# Patient Record
Sex: FEMALE | Race: BLACK OR AFRICAN AMERICAN | NOT HISPANIC OR LATINO
[De-identification: names, ages, dates, MRNs, and addresses within clinical notes are randomized per-mention and may not be internally consistent; named-entity substitution may affect disease eponyms.]

---

## 2017-02-05 ENCOUNTER — TRANSCRIPTION ENCOUNTER (OUTPATIENT)
Age: 52
End: 2017-02-05

## 2017-04-17 ENCOUNTER — APPOINTMENT (OUTPATIENT)
Dept: GASTROENTEROLOGY | Facility: CLINIC | Age: 52
End: 2017-04-17

## 2021-05-21 ENCOUNTER — APPOINTMENT (OUTPATIENT)
Dept: OBGYN | Facility: CLINIC | Age: 56
End: 2021-05-21
Payer: COMMERCIAL

## 2021-05-21 VITALS — DIASTOLIC BLOOD PRESSURE: 85 MMHG | WEIGHT: 293 LBS | BODY MASS INDEX: 242.23 KG/M2 | SYSTOLIC BLOOD PRESSURE: 126 MMHG

## 2021-05-21 PROCEDURE — 99203 OFFICE O/P NEW LOW 30 MIN: CPT

## 2021-05-21 PROCEDURE — 99072 ADDL SUPL MATRL&STAF TM PHE: CPT

## 2021-05-21 NOTE — PHYSICAL EXAM
[Appropriately responsive] : appropriately responsive [Alert] : alert [No Acute Distress] : no acute distress [No Lymphadenopathy] : no lymphadenopathy [Regular Rate Rhythm] : regular rate rhythm [No Murmurs] : no murmurs [Clear to Auscultation B/L] : clear to auscultation bilaterally [Soft] : soft [Non-tender] : non-tender [Non-distended] : non-distended [No HSM] : No HSM [No Lesions] : no lesions [No Mass] : no mass [Oriented x3] : oriented x3 [Examination Of The Breasts] : a normal appearance [No Masses] : no breast masses were palpable [Vulvar Atrophy] : vulvar atrophy [Labia Majora] : normal [Labia Minora] : normal [Normal] : normal [Atrophy] : atrophy [Cystocele] : a cystocele [Absent] : absent [Uterine Adnexae] : normal

## 2021-05-22 LAB
C TRACH RRNA SPEC QL NAA+PROBE: NOT DETECTED
HPV HIGH+LOW RISK DNA PNL CVX: NOT DETECTED
N GONORRHOEA RRNA SPEC QL NAA+PROBE: NOT DETECTED
SOURCE TP AMPLIFICATION: NORMAL

## 2021-05-26 LAB — CYTOLOGY CVX/VAG DOC THIN PREP: NORMAL

## 2021-08-02 PROBLEM — Z82.3 FAMILY HISTORY OF CEREBROVASCULAR ACCIDENT (CVA): Status: ACTIVE | Noted: 2021-08-02

## 2021-08-02 PROBLEM — F12.90 USES MARIJUANA: Status: ACTIVE | Noted: 2021-08-02

## 2021-08-02 PROBLEM — Z80.42 FAMILY HISTORY OF MALIGNANT NEOPLASM OF PROSTATE: Status: ACTIVE | Noted: 2021-08-02

## 2021-08-03 ENCOUNTER — APPOINTMENT (OUTPATIENT)
Dept: UROGYNECOLOGY | Facility: CLINIC | Age: 56
End: 2021-08-03
Payer: COMMERCIAL

## 2021-08-03 ENCOUNTER — TRANSCRIPTION ENCOUNTER (OUTPATIENT)
Age: 56
End: 2021-08-03

## 2021-08-03 ENCOUNTER — RESULT CHARGE (OUTPATIENT)
Age: 56
End: 2021-08-03

## 2021-08-03 VITALS
WEIGHT: 133 LBS | DIASTOLIC BLOOD PRESSURE: 78 MMHG | SYSTOLIC BLOOD PRESSURE: 123 MMHG | HEIGHT: 62 IN | BODY MASS INDEX: 24.48 KG/M2

## 2021-08-03 DIAGNOSIS — F12.90 CANNABIS USE, UNSPECIFIED, UNCOMPLICATED: ICD-10-CM

## 2021-08-03 DIAGNOSIS — Z87.09 PERSONAL HISTORY OF OTHER DISEASES OF THE RESPIRATORY SYSTEM: ICD-10-CM

## 2021-08-03 DIAGNOSIS — Z80.42 FAMILY HISTORY OF MALIGNANT NEOPLASM OF PROSTATE: ICD-10-CM

## 2021-08-03 DIAGNOSIS — Z82.49 FAMILY HISTORY OF ISCHEMIC HEART DISEASE AND OTHER DISEASES OF THE CIRCULATORY SYSTEM: ICD-10-CM

## 2021-08-03 DIAGNOSIS — Z87.01 PERSONAL HISTORY OF PNEUMONIA (RECURRENT): ICD-10-CM

## 2021-08-03 DIAGNOSIS — Z82.3 FAMILY HISTORY OF STROKE: ICD-10-CM

## 2021-08-03 DIAGNOSIS — Z86.39 PERSONAL HISTORY OF OTHER ENDOCRINE, NUTRITIONAL AND METABOLIC DISEASE: ICD-10-CM

## 2021-08-03 DIAGNOSIS — Z86.018 PERSONAL HISTORY OF OTHER BENIGN NEOPLASM: ICD-10-CM

## 2021-08-03 LAB
BILIRUB UR QL STRIP: NEGATIVE
CLARITY UR: CLEAR
COLLECTION METHOD: NORMAL
GLUCOSE UR-MCNC: NEGATIVE
HCG UR QL: 1 EU/DL
HGB UR QL STRIP.AUTO: NEGATIVE
KETONES UR-MCNC: NEGATIVE
LEUKOCYTE ESTERASE UR QL STRIP: NEGATIVE
NITRITE UR QL STRIP: NEGATIVE
PH UR STRIP: 7.5
PROT UR STRIP-MCNC: NEGATIVE
SP GR UR STRIP: 1.01

## 2021-08-03 PROCEDURE — 99204 OFFICE O/P NEW MOD 45 MIN: CPT | Mod: 25

## 2021-08-03 PROCEDURE — 51701 INSERT BLADDER CATHETER: CPT

## 2021-08-03 NOTE — HISTORY OF PRESENT ILLNESS
[FreeTextEntry1] : 20 years of botherome leakage of urine, worse in past 2-3 years. No intervention as of yet. Leakage with cough sneeze exercise running and intercourse > with urge when full and rarely. Pads wet daily. No dysuria, recurrent UTIs, gross hematuria, incomplete emptying, or flank pain. No symptoms of bulge or pressure in vagina or vaginal laxity. Sexually active without issue besides dryness at times. No vaginal bleeding. Chronic constipation she does not feel is diet related as she eats high fiber, healthy, vegetarian diet. Juices - natural vegetable and fruit juices. UI affecting QOL. Nocturia 1. 24 hour VD: 9v0L, intake 36 oz water / 20 oz spinach pineapple juice / 10 oz wine / 40 oz hot tea --> over 100 oz daily\par \par BMI 24\par Allg includes PCNs\par SH not a tobacco smoker\par PSH includes "partial" hysterectomy - likely ELZA, salpingectomy for ectopic pregnancy, and appendectomy (reports ectopic and appendectomy performed during same surg, hyst lsc plus laparotomy via the pfannenstiel)\par

## 2021-08-03 NOTE — OB HISTORY
[Total Preg ___] : : [unfilled] [Full Term ___] : [unfilled] (full-term) [Vaginal ___] : [unfilled] vaginal delivery(s) [Definite ___ (Date)] : the last menstrual period was [unfilled] [Last Pap Smear ___] : date of last pap smear was on [unfilled] [Sexually Active] : sexually active [Abnormal Pap Smear] : normal pap smear [Taking Estrogens] : is not taking estrogen replacement [Abnormal Bleeding] : without bleeding

## 2021-08-03 NOTE — ASSESSMENT
[FreeTextEntry1] : Karlie is a pleasant 57 yo  P4, PSHx includes ELZA, salpingectomy and appendectomy, presents with years of symptomatic KAYLYNN, TUCKER > OAB, and less bothersome POP. On exam, her empty supine CST was neg, however she had positive urethral hypermobility. Her straight-cath PVR volume was normal and the dip was neg. On pelvic exam, she had a positive bulbo reflex. There were no appreciable masses, cysts, or lesions. Pelvic floor muscle contraction strength was present but weak. There was no levator or pelvic floor musculature banding, tightness, or tenderness. POPQ exam demonstrated a short post-hyst TVL with intact cuff, atrophic changes, and decently supported apex. She had a stage II anterior wall prolapse.\par \par The patient has pelvic organ prolapse. Management options including observation, kegels with or without PT, biofeedback, pessary, and surgery were reviewed. Pessary care was reviewed. Surg options obliterative vs reconstructive, major vs minor, abd / robotic vs vaginal, with or without hysterectomy, with or without graft use discussed.\par \par The patient has symptoms consistent with stress urinary incontinence. The etiology of TUCKER was discussed. Management options including observation, behavioral modifications, medication, pessary, Impressa insert, periurethral bulking via cystoscopy, and surgery with midurethral sling were reviewed. Other anti-incontinence procedures such as a Castro or fascial sling also reviewed.\par \par The patient has urinary symptoms consistent with overactive bladder. The etiology of OAB was discussed. Management options including observation, behavioral modifications (dietary changes, monitoring fluid intake, bladder training, timed voids, use of pads/protective garments), kegels, PT, medications, PTNS, SNS, and bladder Botox were all reviewed.\par \par She is interested in surg correction. We discussed apical repairs vs vag anterior repair alone, efficacy, risk of future apical POP and symptoms if only AR performed. We discussed risks of apical major vs minor vaginal repair. \par \par She is interested in MUS/AR and will RTO for cysto, UDS to eval further. All ques answered.\par \par Plan:\par [] cysto\par [] UDS\par [] surg disc - anticipate booking EUA / TVT MUS / AR / cysto / other indicated procedures --> treat OAB thereafter prn (freq may improve with reduced overall daily fluid intake and irritant reduction)\par \par \par

## 2021-08-03 NOTE — REASON FOR VISIT
[Initial Visit ___] : an initial visit for [unfilled] [Questionnaire Received] : Patient questionnaire received [Urinary Incontinence] : urinary incontinence [Pelvic Organ Prolapse] : pelvic organ prolapse [Pelvic Pain] : pelvic pain [Sexual Dysfunction] : sexual dysfunction

## 2021-08-03 NOTE — PHYSICAL EXAM
[2] : 2 [Aa ____] : Aa [unfilled] [Ba ____] : Ba [unfilled] [C ____] : C [unfilled] [GH ____] : GH [unfilled] [PB ____] : PB [unfilled] [TVL ____] : TVL  [unfilled] [Ap ____] : Ap [unfilled] [Bp ____] : Bp [unfilled] [] : I [Absent] : absent [Soft] :  the cervix was soft [Chaperone Present] : A chaperone was present in the examining room during all aspects of the physical examination [No Acute Distress] : in no acute distress [Oriented x3] : oriented to person, place, and time [No Edema] : ~T edema was not present [Symmetrical] : the neck was ~L symmetrical [Soft, Nontender] : the abdomen was soft and nontender [None] : no CVA tenderness [Warm and Dry] : was warm and dry to touch [Normal Gait] : gait was normal [Labia Majora] : were normal [Labia Minora] : were normal [Normal] : was normal [Bartholin's Gland] : both Bartholin's glands were normal  [Normal Appearance] : general appearance was normal [No Bleeding] : there was no active vaginal bleeding [Post Void Residual ____ml] : post void residual was [unfilled] ml [Exam Deferred] : was deferred [Cough] : no cough [Tenderness] : ~T no ~M abdominal tenderness observed [Distended] : not distended [Inguinal LAD] : no adenopathy was noted in the inguinal lymph nodes [FreeTextEntry3] : +urethral hypermobility, empty supine cst neg [FreeTextEntry4] : no cyst mass or lesion; cuff intact; short TVL [de-identified] : nontender, no appreciable mass

## 2021-08-26 ENCOUNTER — APPOINTMENT (OUTPATIENT)
Dept: UROGYNECOLOGY | Facility: CLINIC | Age: 56
End: 2021-08-26
Payer: COMMERCIAL

## 2021-08-26 PROCEDURE — 51741 ELECTRO-UROFLOWMETRY FIRST: CPT

## 2021-08-26 PROCEDURE — 51797 INTRAABDOMINAL PRESSURE TEST: CPT

## 2021-08-26 PROCEDURE — 51729 CYSTOMETROGRAM W/VP&UP: CPT

## 2021-08-26 PROCEDURE — 51784 ANAL/URINARY MUSCLE STUDY: CPT

## 2021-09-24 ENCOUNTER — APPOINTMENT (OUTPATIENT)
Dept: UROGYNECOLOGY | Facility: CLINIC | Age: 56
End: 2021-09-24
Payer: COMMERCIAL

## 2021-09-24 LAB
BILIRUB UR QL STRIP: NEGATIVE
CLARITY UR: CLEAR
COLLECTION METHOD: NORMAL
GLUCOSE UR-MCNC: NEGATIVE
HCG UR QL: 0.2 EU/DL
HGB UR QL STRIP.AUTO: NEGATIVE
KETONES UR-MCNC: NEGATIVE
LEUKOCYTE ESTERASE UR QL STRIP: NEGATIVE
NITRITE UR QL STRIP: NEGATIVE
PH UR STRIP: 6
PROT UR STRIP-MCNC: NEGATIVE
SP GR UR STRIP: 1.02

## 2021-09-24 PROCEDURE — 81003 URINALYSIS AUTO W/O SCOPE: CPT | Mod: NC,QW

## 2021-09-24 PROCEDURE — 99213 OFFICE O/P EST LOW 20 MIN: CPT | Mod: 25

## 2021-09-24 PROCEDURE — 52000 CYSTOURETHROSCOPY: CPT

## 2021-10-05 ENCOUNTER — APPOINTMENT (OUTPATIENT)
Dept: UROGYNECOLOGY | Facility: CLINIC | Age: 56
End: 2021-10-05
Payer: COMMERCIAL

## 2021-10-05 PROCEDURE — 99214 OFFICE O/P EST MOD 30 MIN: CPT

## 2021-10-05 NOTE — ASSESSMENT
[FreeTextEntry1] : KAYLYNN, GSUI and ISD, post-hyst vault POP primarily anterior component. \par \par For the POP, obliterative vs reconstructive surgery discussed, minor vs major procedures discussed. Abdominal versus vaginal routes of surgery were reviewed. Abdominal surgery via robotic laparoscopy vs laparotomy discussed. Surgery with or without graft use discussed. Efficacies, risks, and benefits reviewed. Abdominally, a sacral colpopexy was discussed. Vaginally, a uterosacral ligament suspension or sacrospinous fixation was discussed. With vag minor AR, risk of future recurrence/apical POP discussed.\par \par We discussed the placement of a mid-urethral sling. Risks and benefits of a TOT sling versus a TVT vs mini sling routes were discussed including bladder and bowel perforation, vascular injury and hemorrhage, voiding dysfunction, UTIs, dyspareunia, and groin pain.\par \par The risks and benefits of surgery were discussed and included: risks of general anesthesia, MI, stroke, cardiopulmonary arrest, infection, abscess formation, bleeding, hematoma formation, hemorrhage, transfusion, blood clot formation, fistula formation, rejection, dyspareunia, chronic pain, neuropathy, damage to surrounding structures including but not limited to bowel/ureters/bladder/rectum/nerves/vessels, leakage of urine, voiding dysfunction, OAB, urinary retention, procedure failure, recurrence, need for further surgery, and going home with a catheter. With the use of mesh, risk of mesh erosion or exposure discussed. The FDA warning on transvaginally placed mesh for prolapse correction versus abdominally placed mesh or transvaginally placed mesh for midurethral sling was reviewed. Website for the FDA provided for information as desired. An exam under anesthesia was discussed and consented to as well. Perioperative care, anesthesia, NPO prior to the surgery, and postop precautions were reviewed. All questions were answered. She understood and would like to proceed. Consent was signed and witnessed in the office.\par \par Plan:\par [] book surg: pelvic EUA / RASC / TVT MUS / cystourethroscopy / other indicated procedures / possible APR or laparotomy\par [] med clearance\par [] PSTs\par

## 2021-10-05 NOTE — HISTORY OF PRESENT ILLNESS
[FreeTextEntry1] : Symptomatic KAYLYNN, post-hyst POP. Hx prior pelvic surg. DEsires surg correction, declines nonsurg intervention such as observation, kegels/PT, pessary use; periurethral bulking injections for TUCKER.\par \par Initial exam findings PVR normal, A 0 / C -6 / TVL 7 / GH 4 / P -3 --> repeated at cysto A 0 / P -2 / TVL 7\par \par UDS pvr normal, normal capacity, no DO, +GSUI, +ISD\par Cysto normal\par \par BMI 24\par Allg includes PCNs\par SH not a tobacco smoker\par PSH includes "partial" hysterectomy - likely ELZA, salpingectomy for ectopic pregnancy, and appendectomy (reports ectopic and appendectomy performed during same surg, hyst lsc plus laparotomy via the pfannenstiel)\par

## 2021-11-11 ENCOUNTER — OUTPATIENT (OUTPATIENT)
Dept: OUTPATIENT SERVICES | Facility: HOSPITAL | Age: 56
LOS: 1 days | End: 2021-11-11
Payer: COMMERCIAL

## 2021-11-11 VITALS
SYSTOLIC BLOOD PRESSURE: 100 MMHG | RESPIRATION RATE: 16 BRPM | HEIGHT: 62 IN | WEIGHT: 132.5 LBS | HEART RATE: 68 BPM | OXYGEN SATURATION: 98 % | TEMPERATURE: 97 F | DIASTOLIC BLOOD PRESSURE: 60 MMHG

## 2021-11-11 DIAGNOSIS — Z90.49 ACQUIRED ABSENCE OF OTHER SPECIFIED PARTS OF DIGESTIVE TRACT: Chronic | ICD-10-CM

## 2021-11-11 DIAGNOSIS — Z01.818 ENCOUNTER FOR OTHER PREPROCEDURAL EXAMINATION: ICD-10-CM

## 2021-11-11 DIAGNOSIS — Z90.710 ACQUIRED ABSENCE OF BOTH CERVIX AND UTERUS: Chronic | ICD-10-CM

## 2021-11-11 LAB
ANION GAP SERPL CALC-SCNC: 13 MMOL/L — SIGNIFICANT CHANGE UP (ref 5–17)
APPEARANCE UR: CLEAR — SIGNIFICANT CHANGE UP
APTT BLD: 29 SEC — SIGNIFICANT CHANGE UP (ref 27.5–35.5)
BASOPHILS # BLD AUTO: 0.02 K/UL — SIGNIFICANT CHANGE UP (ref 0–0.2)
BASOPHILS NFR BLD AUTO: 0.6 % — SIGNIFICANT CHANGE UP (ref 0–2)
BILIRUB UR-MCNC: NEGATIVE — SIGNIFICANT CHANGE UP
BUN SERPL-MCNC: 9.9 MG/DL — SIGNIFICANT CHANGE UP (ref 8–20)
CALCIUM SERPL-MCNC: 9.1 MG/DL — SIGNIFICANT CHANGE UP (ref 8.6–10.2)
CHLORIDE SERPL-SCNC: 106 MMOL/L — SIGNIFICANT CHANGE UP (ref 98–107)
CO2 SERPL-SCNC: 25 MMOL/L — SIGNIFICANT CHANGE UP (ref 22–29)
COLOR SPEC: SIGNIFICANT CHANGE UP
CREAT SERPL-MCNC: 0.63 MG/DL — SIGNIFICANT CHANGE UP (ref 0.5–1.3)
DIFF PNL FLD: NEGATIVE — SIGNIFICANT CHANGE UP
EOSINOPHIL # BLD AUTO: 0.03 K/UL — SIGNIFICANT CHANGE UP (ref 0–0.5)
EOSINOPHIL NFR BLD AUTO: 0.8 % — SIGNIFICANT CHANGE UP (ref 0–6)
GLUCOSE SERPL-MCNC: 96 MG/DL — SIGNIFICANT CHANGE UP (ref 70–99)
GLUCOSE UR QL: NEGATIVE MG/DL — SIGNIFICANT CHANGE UP
HCT VFR BLD CALC: 40 % — SIGNIFICANT CHANGE UP (ref 34.5–45)
HGB BLD-MCNC: 12.8 G/DL — SIGNIFICANT CHANGE UP (ref 11.5–15.5)
IMM GRANULOCYTES NFR BLD AUTO: 0.3 % — SIGNIFICANT CHANGE UP (ref 0–1.5)
INR BLD: 0.92 RATIO — SIGNIFICANT CHANGE UP (ref 0.88–1.16)
KETONES UR-MCNC: NEGATIVE — SIGNIFICANT CHANGE UP
LEUKOCYTE ESTERASE UR-ACNC: NEGATIVE — SIGNIFICANT CHANGE UP
LYMPHOCYTES # BLD AUTO: 1.54 K/UL — SIGNIFICANT CHANGE UP (ref 1–3.3)
LYMPHOCYTES # BLD AUTO: 42.5 % — SIGNIFICANT CHANGE UP (ref 13–44)
MCHC RBC-ENTMCNC: 30.6 PG — SIGNIFICANT CHANGE UP (ref 27–34)
MCHC RBC-ENTMCNC: 32 GM/DL — SIGNIFICANT CHANGE UP (ref 32–36)
MCV RBC AUTO: 95.7 FL — SIGNIFICANT CHANGE UP (ref 80–100)
MONOCYTES # BLD AUTO: 0.43 K/UL — SIGNIFICANT CHANGE UP (ref 0–0.9)
MONOCYTES NFR BLD AUTO: 11.9 % — SIGNIFICANT CHANGE UP (ref 2–14)
NEUTROPHILS # BLD AUTO: 1.59 K/UL — LOW (ref 1.8–7.4)
NEUTROPHILS NFR BLD AUTO: 43.9 % — SIGNIFICANT CHANGE UP (ref 43–77)
NITRITE UR-MCNC: NEGATIVE — SIGNIFICANT CHANGE UP
PH UR: 6.5 — SIGNIFICANT CHANGE UP (ref 5–8)
PLATELET # BLD AUTO: 209 K/UL — SIGNIFICANT CHANGE UP (ref 150–400)
POTASSIUM SERPL-MCNC: 4.3 MMOL/L — SIGNIFICANT CHANGE UP (ref 3.5–5.3)
POTASSIUM SERPL-SCNC: 4.3 MMOL/L — SIGNIFICANT CHANGE UP (ref 3.5–5.3)
PROT UR-MCNC: NEGATIVE MG/DL — SIGNIFICANT CHANGE UP
PROTHROM AB SERPL-ACNC: 10.7 SEC — SIGNIFICANT CHANGE UP (ref 10.6–13.6)
RBC # BLD: 4.18 M/UL — SIGNIFICANT CHANGE UP (ref 3.8–5.2)
RBC # FLD: 12.8 % — SIGNIFICANT CHANGE UP (ref 10.3–14.5)
SODIUM SERPL-SCNC: 144 MMOL/L — SIGNIFICANT CHANGE UP (ref 135–145)
SP GR SPEC: 1.01 — SIGNIFICANT CHANGE UP (ref 1.01–1.02)
UROBILINOGEN FLD QL: NEGATIVE MG/DL — SIGNIFICANT CHANGE UP
WBC # BLD: 3.62 K/UL — LOW (ref 3.8–10.5)
WBC # FLD AUTO: 3.62 K/UL — LOW (ref 3.8–10.5)

## 2021-11-11 PROCEDURE — 93010 ELECTROCARDIOGRAM REPORT: CPT

## 2021-11-11 PROCEDURE — 36415 COLL VENOUS BLD VENIPUNCTURE: CPT

## 2021-11-11 PROCEDURE — 85730 THROMBOPLASTIN TIME PARTIAL: CPT

## 2021-11-11 PROCEDURE — 81003 URINALYSIS AUTO W/O SCOPE: CPT

## 2021-11-11 PROCEDURE — 85610 PROTHROMBIN TIME: CPT

## 2021-11-11 PROCEDURE — 93005 ELECTROCARDIOGRAM TRACING: CPT

## 2021-11-11 PROCEDURE — 85025 COMPLETE CBC W/AUTO DIFF WBC: CPT

## 2021-11-11 PROCEDURE — 80048 BASIC METABOLIC PNL TOTAL CA: CPT

## 2021-11-11 PROCEDURE — G0463: CPT

## 2021-11-11 PROCEDURE — 87086 URINE CULTURE/COLONY COUNT: CPT

## 2021-11-11 RX ORDER — INFLUENZA VIRUS VACCINE 15; 15; 15; 15 UG/.5ML; UG/.5ML; UG/.5ML; UG/.5ML
0.5 SUSPENSION INTRAMUSCULAR ONCE
Refills: 0 | Status: DISCONTINUED | OUTPATIENT
Start: 2021-11-11 | End: 2021-11-25

## 2021-11-11 RX ORDER — GENTAMICIN SULFATE 40 MG/ML
300 VIAL (ML) INJECTION ONCE
Refills: 0 | Status: COMPLETED | OUTPATIENT
Start: 2021-12-01 | End: 2021-12-01

## 2021-11-11 RX ORDER — METRONIDAZOLE 500 MG
500 TABLET ORAL ONCE
Refills: 0 | Status: COMPLETED | OUTPATIENT
Start: 2021-12-01 | End: 2021-12-01

## 2021-11-11 NOTE — H&P PST ADULT - NSANTHOSAYNRD_GEN_A_CORE
No. EDU screening performed.  STOP BANG Legend: 0-2 = LOW Risk; 3-4 = INTERMEDIATE Risk; 5-8 = HIGH Risk

## 2021-11-11 NOTE — H&P PST ADULT - NSICDXFAMILYHX_GEN_ALL_CORE_FT
FAMILY HISTORY:  Father  Still living? No  FH: heart disease, Age at diagnosis: Age Unknown    Mother  Still living? No  FHx: dementia, Age at diagnosis: Age Unknown    Sibling  Still living? Yes, Estimated age: Age Unknown  FH: stroke, Age at diagnosis: Age Unknown

## 2021-11-11 NOTE — H&P PST ADULT - PROBLEM SELECTOR PLAN 2
Pt is scheduled for a robotic assisted sacrocolpopexy, possible anterior and posterior repair midurrthral cystoscopy with Dr Peres 12/1/2021.

## 2021-11-11 NOTE — H&P PST ADULT - ASSESSMENT
56-year-old female presents to PST for a      Patients chief complaint of urinary stress incontinence and vaginal bulge with uncomfortable pressure.  Pt experiences some pain when pressure is applied to lower abdomin.  Pt states she does not take any medication, velazquez deals with it.  . She denies hot flashes or vaginal discharge or itching. Pt is scheduled for a robotic assisted sacrocolpopexy, possible anterior and posterior repair midurrthral cystoscopy with Dr Peres 2021.      Patient was educated on preoperative preperation with written and verbal instruction . Patient is going for medical clearance with Dr. Lindsay  patient will review medications with PCP. Patient was educated on aspirin and aspirin products NSAIDS ,vitamins and herbals that increase the risk of bleeding and need to be stopped five days before procedure  . Patient was also educated on covid testing and covid prevention ,social distancing and wearing a mask.     Pt recieved COVID vaccineation  [employer].  COVID testing scheduled 2021    OPIOID RISK TOOL    ANNE EACH BOX THAT APPLIES AND ADD TOTALS AT THE END    FAMILY HISTORY OF SUBSTANCE ABUSE                 FEMALE         MALE                                                Alcohol                             [  ]1 pt          [  ]3pts                                               Illegal Durgs                     [  ]2 pts        [  ]3pts                                               Rx Drugs                           [  ]4 pts        [  ]4 pts    PERSONAL HISTORY OF SUBSTANCE ABUSE                                                                                          Alcohol                             [  ]3 pts       [  ]3 pts                                               Illegal Drugs                     [  ]4 pts        [  ]4 pts                                               Rx Drugs                           [  ]5 pts        [  ]5 pts    AGE BETWEEN 16-45 YEARS                                      [  ]1 pt         [  ]1 pt    HISTORY OF PREADOLESCENT   SEXUAL ABUSE                                                             [  ]3 pts        [  ]0pts    PSYCHOLOGICAL DISEASE                     ADD, OCD, Bipolar, Schizophrenia        [  ]2 pts         [  ]2 pts                      Depression                                               [  ]1 pt           [  ]1 pt           SCORING TOTAL   (add numbers and type here)              0                                  CAPRINI SCORE [CLOT]    AGE RELATED RISK FACTORS                                                       MOBILITY RELATED FACTORS  [x ] Age 41-60 years                                            (1 Point)                  [ ] Bed rest                                                        (1 Point)  [ ] Age: 61-74 years                                           (2 Points)                 [ ] Plaster cast                                                   (2 Points)  [ ] Age= 75 years                                              (3 Points)                 [ ] Bed bound for more than 72 hours                 (2 Points)    DISEASE RELATED RISK FACTORS                                               GENDER SPECIFIC FACTORS  [ ] Edema in the lower extremities                       (1 Point)                  [ ] Pregnancy                                                     (1 Point)  [ ] Varicose veins                                               (1 Point)                  [ ] Post-partum < 6 weeks                                   (1 Point)             [ ] BMI > 25 Kg/m2                                            (1 Point)                  [ ] Hormonal therapy  or oral contraception          (1 Point)                 [ ] Sepsis (in the previous month)                        (1 Point)                  [ ] History of pregnancy complications                 (1 point)  [ ] Pneumonia or serious lung disease                                               [ ] Unexplained or recurrent                     (1 Point)           (in the previous month)                               (1 Point)  [ ] Abnormal pulmonary function test                     (1 Point)                 SURGERY RELATED RISK FACTORS  [ ] Acute myocardial infarction                              (1 Point)                 [ ]  Section                                             (1 Point)  [ ] Congestive heart failure (in the previous month)  (1 Point)               [ ] Minor surgery                                                  (1 Point)   [ ] Inflammatory bowel disease                             (1 Point)                 [ ] Arthroscopic surgery                                        (2 Points)  [ ] Central venous access                                      (2 Points)                [x ] General surgery lasting more than 45 minutes   (2 Points)       [ ] Stroke (in the previous month)                          (5 Points)               [ ] Elective arthroplasty                                         (5 Points)                                                                                                                                               HEMATOLOGY RELATED FACTORS                                                 TRAUMA RELATED RISK FACTORS  [ ] Prior episodes of VTE                                     (3 Points)                [ ] Fracture of the hip, pelvis, or leg                       (5 Points)  [ ] Positive family history for VTE                         (3 Points)                 [ ] Acute spinal cord injury (in the previous month)  (5 Points)  [ ] Prothrombin 67750 A                                     (3 Points)                 [ ] Paralysis  (less than 1 month)                             (5 Points)  [ ] Factor V Leiden                                             (3 Points)                  [ ] Multiple Trauma within 1 month                        (5 Points)  [ ] Lupus anticoagulants                                     (3 Points)                                                           [ ] Anticardiolipin antibodies                               (3 Points)                                                       [ ] High homocysteine in the blood                      (3 Points)                                             [ ] Other congenital or acquired thrombophilia      (3 Points)                                                [ ] Heparin induced thrombocytopenia                  (3 Points)                                          Total Score [      3    ]

## 2021-11-11 NOTE — PATIENT PROFILE ADULT - NSPREOP1_NPOAFTER_GEN_A_NUR
Digital Medicine: Health  Follow-Up    The history is provided by the patient.             Reason for review: Blood pressure not at goal        Topics Covered on Call: physical activity and Diet    Additional Follow-up details: Average blood pressure today is 142/87. Patient reports that that she has been really stressed and busy. She doesn't feel that she rests enough before taking her readings. She is busy during the week taking care of her grandkids and running them places. Denies hypertension symptoms today. She occasionally is SOB when she is doing activity and will have some palpitations occasionally at night but has talked to her provider about these things.             Diet-no change to diet    No change to diet.  Patient reports eating or drinking the following: She is still working on a low sodium diet. She pays attention to what she is eating and doesn't add salt.      Physical Activity-Change      Additional physical activity details: She feels that she has been more active lately. She is getting her walking in and doing more yard work. This has been good for her.       Medication Adherence-Medication adherence was assessed.      Substance, Sleep, Stress-Not assessed      Continue current diet/physical activity routine.       Addressed patient questions and patient has my contact information if needed prior to next outreach. Patient verbalizes understanding.             There are no preventive care reminders to display for this patient.      Last 5 Patient Entered Readings                                      Current 30 Day Average: 142/87     Recent Readings 10/25/2020 10/25/2020 10/12/2020 10/1/2020 10/1/2020    SBP (mmHg) 141 139 142 134 134    DBP (mmHg) 90 87 84 86 96    Pulse 79 82 73 74 75                23:00

## 2021-11-11 NOTE — H&P PST ADULT - HISTORY OF PRESENT ILLNESS
55-year-old female presents to PST for a      Patients chief complaint of urinary stress incontinence and vaginal bulge. She denies hot flashes or vaginal discharge or itching.    55-year-old female presents to PST for a      Patients chief complaint of urinary stress incontinence and vaginal bulge with uncomfortable pressure.  Pt experiences some pain when pressure is applied to lower abdomin.  Pt states she does not take any medication, velazquez deals with it.  . She denies hot flashes or vaginal discharge or itching. Pt is scheduled for a robotic assisted sacrocolpopexy, possible anterior and posterior repair midurrthral cystoscopy with Dr Peres 12/1/2021.   56-year-old female presents to PST for a      Patients chief complaint of urinary stress incontinence and vaginal bulge with uncomfortable pressure.  Pt experiences some pain when pressure is applied to lower abdomin.  Pt states she does not take any medication, velazquez deals with it.  . She denies hot flashes or vaginal discharge or itching. Pt is scheduled for a robotic assisted sacrocolpopexy, possible anterior and posterior repair midurrthral cystoscopy with Dr Peres 12/1/2021.

## 2021-11-11 NOTE — H&P PST ADULT - ATTENDING COMMENTS
Patient seen, continues to desire surgical correction of POP and GSUI, declines nonsurg intervention such as observation, kegels/PT, or pessary use. Risks of surgery including but not limited to bleeding, hematoma formation, transfusion, hemorrhage, cardiopulmonary arrest and risks of anesthesia, stroke, MI, fistula formation, mesh erosion or exposure, blood clot, failure, need for further surgery, recurrence, urinary retention or incontinence, going home with a catheter, damage to surrounding structures such as bowel / bladder / rectum / ureters / nerves / vessels all reviewed and she would like to proceed. Consent signed and witnessed in office, reviewed today. All ques answered. Proceed with EUA / RASC / TVT MUS / cysto / other indicated procedures / possible APR and possible laparotomy.

## 2021-11-12 LAB
CULTURE RESULTS: SIGNIFICANT CHANGE UP
SPECIMEN SOURCE: SIGNIFICANT CHANGE UP

## 2021-11-16 ENCOUNTER — OUTPATIENT (OUTPATIENT)
Dept: OUTPATIENT SERVICES | Facility: HOSPITAL | Age: 56
LOS: 1 days | End: 2021-11-16
Payer: COMMERCIAL

## 2021-11-16 DIAGNOSIS — Z01.812 ENCOUNTER FOR PREPROCEDURAL LABORATORY EXAMINATION: ICD-10-CM

## 2021-11-16 DIAGNOSIS — Z90.710 ACQUIRED ABSENCE OF BOTH CERVIX AND UTERUS: Chronic | ICD-10-CM

## 2021-11-16 DIAGNOSIS — Z90.49 ACQUIRED ABSENCE OF OTHER SPECIFIED PARTS OF DIGESTIVE TRACT: Chronic | ICD-10-CM

## 2021-11-16 PROBLEM — Z78.9 OTHER SPECIFIED HEALTH STATUS: Chronic | Status: ACTIVE | Noted: 2021-11-11

## 2021-11-16 LAB — BLD GP AB SCN SERPL QL: SIGNIFICANT CHANGE UP

## 2021-11-16 PROCEDURE — 86850 RBC ANTIBODY SCREEN: CPT

## 2021-11-16 PROCEDURE — 36415 COLL VENOUS BLD VENIPUNCTURE: CPT

## 2021-11-16 PROCEDURE — 86901 BLOOD TYPING SEROLOGIC RH(D): CPT

## 2021-11-16 PROCEDURE — 86900 BLOOD TYPING SEROLOGIC ABO: CPT

## 2021-11-28 ENCOUNTER — APPOINTMENT (OUTPATIENT)
Dept: DISASTER EMERGENCY | Facility: CLINIC | Age: 56
End: 2021-11-28

## 2021-11-28 DIAGNOSIS — Z01.818 ENCOUNTER FOR OTHER PREPROCEDURAL EXAMINATION: ICD-10-CM

## 2021-11-29 LAB — SARS-COV-2 N GENE NPH QL NAA+PROBE: NOT DETECTED

## 2021-11-30 ENCOUNTER — TRANSCRIPTION ENCOUNTER (OUTPATIENT)
Age: 56
End: 2021-11-30

## 2021-11-30 RX ORDER — OXYCODONE AND ACETAMINOPHEN 5; 325 MG/1; MG/1
5-325 TABLET ORAL
Qty: 12 | Refills: 0 | Status: ACTIVE | COMMUNITY
Start: 2021-11-30 | End: 1900-01-01

## 2021-11-30 RX ADMIN — Medication 30 MILLIGRAM(S): at 23:27

## 2021-12-01 ENCOUNTER — APPOINTMENT (OUTPATIENT)
Dept: UROGYNECOLOGY | Facility: HOSPITAL | Age: 56
End: 2021-12-01
Payer: SELF-PAY

## 2021-12-01 ENCOUNTER — INPATIENT (INPATIENT)
Facility: HOSPITAL | Age: 56
LOS: 1 days | Discharge: HOME | DRG: 747 | End: 2021-12-03
Attending: OBSTETRICS & GYNECOLOGY | Admitting: OBSTETRICS & GYNECOLOGY
Payer: COMMERCIAL

## 2021-12-01 VITALS — HEIGHT: 62 IN | HEART RATE: 73 BPM | RESPIRATION RATE: 16 BRPM | WEIGHT: 132.28 LBS

## 2021-12-01 DIAGNOSIS — Z90.710 ACQUIRED ABSENCE OF BOTH CERVIX AND UTERUS: Chronic | ICD-10-CM

## 2021-12-01 DIAGNOSIS — Z90.49 ACQUIRED ABSENCE OF OTHER SPECIFIED PARTS OF DIGESTIVE TRACT: Chronic | ICD-10-CM

## 2021-12-01 DIAGNOSIS — N39.3 STRESS INCONTINENCE (FEMALE) (MALE): ICD-10-CM

## 2021-12-01 PROCEDURE — 57425 LAPAROSCOPY SURG COLPOPEXY: CPT

## 2021-12-01 PROCEDURE — 58660 LAPAROSCOPY LYSIS: CPT

## 2021-12-01 PROCEDURE — 57288 REPAIR BLADDER DEFECT: CPT

## 2021-12-01 RX ORDER — ACETAMINOPHEN 500 MG
975 TABLET ORAL ONCE
Refills: 0 | Status: COMPLETED | OUTPATIENT
Start: 2021-12-01 | End: 2021-12-01

## 2021-12-01 RX ORDER — GENTAMICIN SULFATE 40 MG/ML
150 VIAL (ML) INJECTION ONCE
Refills: 0 | Status: COMPLETED | OUTPATIENT
Start: 2021-12-01 | End: 2021-12-01

## 2021-12-01 RX ORDER — POLYETHYLENE GLYCOL 3350 17 G/17G
17 POWDER, FOR SOLUTION ORAL AT BEDTIME
Refills: 0 | Status: DISCONTINUED | OUTPATIENT
Start: 2021-12-01 | End: 2021-12-03

## 2021-12-01 RX ORDER — SODIUM CHLORIDE 9 MG/ML
1000 INJECTION, SOLUTION INTRAVENOUS
Refills: 0 | Status: DISCONTINUED | OUTPATIENT
Start: 2021-12-01 | End: 2021-12-02

## 2021-12-01 RX ORDER — SIMETHICONE 80 MG/1
80 TABLET, CHEWABLE ORAL EVERY 6 HOURS
Refills: 0 | Status: DISCONTINUED | OUTPATIENT
Start: 2021-12-01 | End: 2021-12-03

## 2021-12-01 RX ORDER — ACETAMINOPHEN 500 MG
975 TABLET ORAL EVERY 6 HOURS
Refills: 0 | Status: DISCONTINUED | OUTPATIENT
Start: 2021-12-01 | End: 2021-12-03

## 2021-12-01 RX ORDER — GABAPENTIN 400 MG/1
300 CAPSULE ORAL EVERY 8 HOURS
Refills: 0 | Status: DISCONTINUED | OUTPATIENT
Start: 2021-12-01 | End: 2021-12-03

## 2021-12-01 RX ORDER — FENTANYL CITRATE 50 UG/ML
25 INJECTION INTRAVENOUS
Refills: 0 | Status: DISCONTINUED | OUTPATIENT
Start: 2021-12-01 | End: 2021-12-01

## 2021-12-01 RX ORDER — KETOROLAC TROMETHAMINE 30 MG/ML
30 SYRINGE (ML) INJECTION EVERY 8 HOURS
Refills: 0 | Status: DISCONTINUED | OUTPATIENT
Start: 2021-12-01 | End: 2021-12-02

## 2021-12-01 RX ORDER — ENOXAPARIN SODIUM 100 MG/ML
40 INJECTION SUBCUTANEOUS DAILY
Refills: 0 | Status: DISCONTINUED | OUTPATIENT
Start: 2021-12-01 | End: 2021-12-03

## 2021-12-01 RX ORDER — ISOTRETINOIN 20 MG/1
0 CAPSULE, LIQUID FILLED ORAL
Qty: 0 | Refills: 0 | DISCHARGE

## 2021-12-01 RX ORDER — SODIUM CHLORIDE 9 MG/ML
3 INJECTION INTRAMUSCULAR; INTRAVENOUS; SUBCUTANEOUS EVERY 8 HOURS
Refills: 0 | Status: DISCONTINUED | OUTPATIENT
Start: 2021-12-01 | End: 2021-12-01

## 2021-12-01 RX ORDER — SODIUM CHLORIDE 9 MG/ML
1000 INJECTION, SOLUTION INTRAVENOUS
Refills: 0 | Status: DISCONTINUED | OUTPATIENT
Start: 2021-12-01 | End: 2021-12-01

## 2021-12-01 RX ORDER — ONDANSETRON 8 MG/1
8 TABLET, FILM COATED ORAL EVERY 8 HOURS
Refills: 0 | Status: DISCONTINUED | OUTPATIENT
Start: 2021-12-01 | End: 2021-12-03

## 2021-12-01 RX ORDER — OXYCODONE HYDROCHLORIDE 5 MG/1
5 TABLET ORAL
Refills: 0 | Status: DISCONTINUED | OUTPATIENT
Start: 2021-12-01 | End: 2021-12-03

## 2021-12-01 RX ADMIN — Medication 975 MILLIGRAM(S): at 06:51

## 2021-12-01 RX ADMIN — Medication 975 MILLIGRAM(S): at 18:43

## 2021-12-01 RX ADMIN — Medication 207.5 MILLIGRAM(S): at 15:30

## 2021-12-01 RX ADMIN — Medication 975 MILLIGRAM(S): at 19:13

## 2021-12-01 RX ADMIN — SODIUM CHLORIDE 100 MILLILITER(S): 9 INJECTION, SOLUTION INTRAVENOUS at 23:13

## 2021-12-01 RX ADMIN — POLYETHYLENE GLYCOL 3350 17 GRAM(S): 17 POWDER, FOR SOLUTION ORAL at 23:12

## 2021-12-01 RX ADMIN — Medication 200 MILLIGRAM(S): at 07:40

## 2021-12-01 RX ADMIN — Medication 30 MILLIGRAM(S): at 23:13

## 2021-12-01 RX ADMIN — Medication 975 MILLIGRAM(S): at 23:12

## 2021-12-01 RX ADMIN — Medication 500 MILLIGRAM(S): at 07:50

## 2021-12-02 LAB
ANION GAP SERPL CALC-SCNC: 13 MMOL/L — SIGNIFICANT CHANGE UP (ref 5–17)
BUN SERPL-MCNC: 9.4 MG/DL — SIGNIFICANT CHANGE UP (ref 8–20)
CALCIUM SERPL-MCNC: 8.5 MG/DL — LOW (ref 8.6–10.2)
CHLORIDE SERPL-SCNC: 111 MMOL/L — HIGH (ref 98–107)
CO2 SERPL-SCNC: 20 MMOL/L — LOW (ref 22–29)
CREAT SERPL-MCNC: 0.63 MG/DL — SIGNIFICANT CHANGE UP (ref 0.5–1.3)
GLUCOSE SERPL-MCNC: 107 MG/DL — HIGH (ref 70–99)
HCT VFR BLD CALC: 34.5 % — SIGNIFICANT CHANGE UP (ref 34.5–45)
HGB BLD-MCNC: 11 G/DL — LOW (ref 11.5–15.5)
MCHC RBC-ENTMCNC: 30.6 PG — SIGNIFICANT CHANGE UP (ref 27–34)
MCHC RBC-ENTMCNC: 31.9 GM/DL — LOW (ref 32–36)
MCV RBC AUTO: 95.8 FL — SIGNIFICANT CHANGE UP (ref 80–100)
PLATELET # BLD AUTO: 153 K/UL — SIGNIFICANT CHANGE UP (ref 150–400)
POTASSIUM SERPL-MCNC: 4.2 MMOL/L — SIGNIFICANT CHANGE UP (ref 3.5–5.3)
POTASSIUM SERPL-SCNC: 4.2 MMOL/L — SIGNIFICANT CHANGE UP (ref 3.5–5.3)
RBC # BLD: 3.6 M/UL — LOW (ref 3.8–5.2)
RBC # FLD: 13 % — SIGNIFICANT CHANGE UP (ref 10.3–14.5)
SODIUM SERPL-SCNC: 144 MMOL/L — SIGNIFICANT CHANGE UP (ref 135–145)
TROPONIN T SERPL-MCNC: <0.01 NG/ML — SIGNIFICANT CHANGE UP (ref 0–0.06)
WBC # BLD: 8.02 K/UL — SIGNIFICANT CHANGE UP (ref 3.8–10.5)
WBC # FLD AUTO: 8.02 K/UL — SIGNIFICANT CHANGE UP (ref 3.8–10.5)

## 2021-12-02 PROCEDURE — 99222 1ST HOSP IP/OBS MODERATE 55: CPT

## 2021-12-02 PROCEDURE — 93010 ELECTROCARDIOGRAM REPORT: CPT

## 2021-12-02 RX ADMIN — Medication 975 MILLIGRAM(S): at 12:38

## 2021-12-02 RX ADMIN — GABAPENTIN 300 MILLIGRAM(S): 400 CAPSULE ORAL at 23:07

## 2021-12-02 RX ADMIN — Medication 975 MILLIGRAM(S): at 12:08

## 2021-12-02 RX ADMIN — ENOXAPARIN SODIUM 40 MILLIGRAM(S): 100 INJECTION SUBCUTANEOUS at 12:08

## 2021-12-02 RX ADMIN — Medication 30 MILLIGRAM(S): at 05:00

## 2021-12-02 RX ADMIN — Medication 30 MILLIGRAM(S): at 14:25

## 2021-12-02 RX ADMIN — Medication 30 MILLIGRAM(S): at 14:10

## 2021-12-02 RX ADMIN — POLYETHYLENE GLYCOL 3350 17 GRAM(S): 17 POWDER, FOR SOLUTION ORAL at 23:00

## 2021-12-02 RX ADMIN — Medication 975 MILLIGRAM(S): at 00:00

## 2021-12-02 RX ADMIN — Medication 975 MILLIGRAM(S): at 05:00

## 2021-12-02 RX ADMIN — Medication 975 MILLIGRAM(S): at 23:26

## 2021-12-02 NOTE — CONSULT NOTE ADULT - ASSESSMENT
56-year-old female presents to PST for a      Patients chief complaint of urinary stress incontinence and vaginal bulge with uncomfortable pressure.  Pt experiences some pain when pressure is applied to lower abdomin.  Pt states she does not take any medication, velazquez deals with it.  . She denies hot flashes or vaginal discharge or itching. Pt is scheduled for a robotic assisted sacrocolpopexy, possible anterior and posterior repair midurrthral cystoscopy with Dr Peres 12/1/2021.   (11 Nov 2021 09:30)    Above Appreciated   Cardiology consult requested for evaluation of ECG. Pt states that 5AM this morning after coughing spell she started feeling midsternal chest tightness that worsens with deep inspiration. Pt denies SOB, palpitations, or worsening chest pain symptoms with exertion. Pt states that when she walks around she feels easily fatigued. ECG-NSR HR @ 63, early repolarization in anterolateral leads, Trop#1-negative.     Chest Pain  ECG-NSR HR @ 63, early repolarization in anterolateral leads  Trop#1-negative  Pt does not have social contributing factors or medical history that increases risk of CAD  Chest pain is pleuritic in nature  Cont to monitor symptoms and HR   Encourage incentive spirometer use    Pt encouraged to alert bedside RN of chest pain recurrence or worsening

## 2021-12-02 NOTE — CONSULT NOTE ADULT - SUBJECTIVE AND OBJECTIVE BOX
[place] [facility]                                                               Office:  [address] [address]                                                              Telephone: [phone]. Fax:[phone]                                                                        CARDIOLOGY CONSULTATION NOTE                                                                                             Consult requested by:  Dr. Brand  Reason for Consultation: Chest Pain  Primary Cardiology: None  History obtained by: Patient and medical record   obtained: No    Chief complaint:    Patient is a 56y old  Female who presents with a chief complaint of surgery; postop (02 Dec 2021 16:21)        HPI:  56-year-old female presents to PST for a      Patients chief complaint of urinary stress incontinence and vaginal bulge with uncomfortable pressure.  Pt experiences some pain when pressure is applied to lower abdomin.  Pt states she does not take any medication, velazquez deals with it.  . She denies hot flashes or vaginal discharge or itching. Pt is scheduled for a robotic assisted sacrocolpopexy, possible anterior and posterior repair midurrthral cystoscopy with Dr Peres 12/1/2021.   (11 Nov 2021 09:30)    Above Appreciated   Cardiology consult requested for evaluation of ECG. Pt states that 5AM this morning after coughing spell she started feeling midsternal chest tightness that worsens with deep inspiration. Pt denies SOB, palpitations, or worsening chest pain symptoms with exertion. Pt states that when she walks around she feels easily fatigued. ECG-NSR HR @ 63, early repolarization in anterolateral leads, Trop#1-negative.     REVIEW OF SYMPTOMS:     CONSTITUTIONAL: No fever, weight loss, or fatigue  ENMT:  No difficulty hearing, tinnitus, vertigo; No sinus or throat pain  NECK: No pain or stiffness  CARDIOVASCULAR: See HPI  RESPIRATORY: No Dyspnea on exertion, Shortness of breath, cough, wheezing  : No dysuria, no hematuria   GI: No dark color stool, no melena, no diarrhea, no constipation, no abdominal pain   NEURO: No headache, no dizziness, no slurred speech   MUSCULOSKELETAL: No joint pain or swelling; No muscle, back, or extremity pain  PSYCH: No agitation, no anxiety.    ALL OTHER REVIEW OF SYSTEMS ARE NEGATIVE.      ALLERGIES: Allergies    peanuts (Anaphylaxis)  penicillins (Anaphylaxis)  Shrimp (Anaphylaxis)    Intolerances          PAST MEDICAL HISTORY  No pertinent past medical history        PAST SURGICAL HISTORY  No significant past surgical history    No significant past surgical history    H/O: hysterectomy    S/P appendectomy        FAMILY HISTORY:  FHx: dementia (Mother)    FH: stroke (Sibling)    FH: heart disease (Father) with stents        SOCIAL HISTORY:    CIGARETTES: Denies  ALCOHOL: Social  DRUGS: Social Marijuana use      CURRENT MEDICATIONS:  acetaminophen     Tablet   polyethylene glycol 3350  enoxaparin Injectable  influenza   Vaccine      Vital Signs Last 24 Hrs  T(C): 36.7 (02 Dec 2021 16:40), Max: 36.8 (02 Dec 2021 04:43)  T(F): 98.1 (02 Dec 2021 16:40), Max: 98.2 (02 Dec 2021 04:43)  HR: 86 (02 Dec 2021 16:40) (74 - 86)  BP: 100/67 (02 Dec 2021 16:40) (100/67 - 127/76)  RR: 18 (02 Dec 2021 16:40) (18 - 18)  SpO2: 98% (02 Dec 2021 16:40) (95% - 99%)      PHYSICAL EXAM:  Constitutional: Comfortable . No acute distress.   HEENT: Atraumatic and normocephalic , neck is supple . no JVD. No carotid bruit. PEERL   CNS: A&Ox3. No focal deficits. EOMI.   Lymph Nodes: Cervical : Not palpable.  Respiratory: CTAB, intermittent cough  Cardiovascular: S1S2 RRR. No murmur/rubs or gallop   Gastrointestinal: Soft non-tender and non distended, +Bowel sounds. negative Ledesma's sign.  Extremities: No edema.   Psychiatric: Calm . no agitation.  Skin: No skin rash/ulcers visualized to face, hands or feet.    Intake and output:   12-01 @ 07:01  -  12-02 @ 07:00  --------------------------------------------------------  IN: 0 mL / OUT: 3550 mL / NET: -3550 mL    12-02 @ 07:01  -  12-02 @ 19:58  --------------------------------------------------------  IN: 1160 mL / OUT: 1400 mL / NET: -240 mL        LABS:                        11.0   8.02  )-----------( 153      ( 02 Dec 2021 05:43 )             34.5     12-02    144  |  111<H>  |  9.4  ----------------------------<  107<H>  4.2   |  20.0<L>  |  0.63    Ca    8.5<L>      02 Dec 2021 05:44      CARDIAC MARKERS ( 02 Dec 2021 18:25 )  x     / <0.01 ng/mL / x     / x     / x          INTERPRETATION OF TELEMETRY:   ECG: NSR HR @ 63, early repolarization in anterolateral leads

## 2021-12-02 NOTE — PROGRESS NOTE ADULT - ATTENDING COMMENTS
Patient seen and examined at 6:45 am today. Agree with Dr. Merrill's PGY-2 note above, however the patient did not c/o chest pain with inspiration to me. She has some pins and needles sensation left 1st-3rd digits and hand/distal LUE without pain, without weakness or numbness and denies CP. She denies dizziness/lightheadedness/CP/SOB. Carlita crackers and water. No N/V. +flatus, no BM. No subjective fever or chills. Not OOB yet. Schwab in. Pain controlled. Appears bright and well. VSS, UOP adequate for 60 kg weight clear yellow urine. Abd soft, ND, minimally tender, no R/G. No calf tend b/l. Labs this morn normal and appropriate for POD1. Will observe this morn. DVT prophyl with OOB and Lovenox. Reg diet. OOB. Cont ISS use. Keep schwab in, anticipate leaving it in until tomorrow and observing patient today. GI and general postop precautions reviewed in detail. PO pain meds. If carlita PO, can DC IVF later today. Will f/u the EKG closely.    SOWMYA Peres MD  [phone] (cell) Patient seen and examined at 6:45 am today. Patient had robotic SCP / TVT MUS / cysto with extensive ISABEL yesterday. Agree with Dr. Merrill's PGY-2 note above, however the patient did not c/o chest pain with inspiration to me. She has some pins and needles sensation left 1st-3rd digits and hand/distal LUE without pain, without weakness or numbness and denies CP. She denies dizziness/lightheadedness/CP/SOB. Carlita crackers and water. No N/V. +flatus, no BM. No subjective fever or chills. Not OOB yet. Schwab in. Pain controlled. Appears bright and well. VSS, UOP adequate for 60 kg weight clear yellow urine. Abd soft, ND, minimally tender, no R/G. No calf tend b/l. Labs this morn normal and appropriate for POD1. Will observe this morn. DVT prophyl with OOB and Lovenox. Reg diet. OOB. Cont ISS use. Keep schwab in, anticipate leaving it in until tomorrow and observing patient today. GI and general postop precautions reviewed in detail. PO pain meds. If carlita PO, can DC IVF later today. Will f/u the EKG closely.    SOWMYA Peres MD  [phone] (cell)

## 2021-12-02 NOTE — PROGRESS NOTE ADULT - SUBJECTIVE AND OBJECTIVE BOX
MONCHO SAHNI is a 57yo now POD#1 s/p robotic-assisted sacrocolpopexy, midurethral sling, and cystoscopy for vaginal prolapse and TUCKER.     S:    Patient was seen and examined at bedside. Pain is controlled with PRN medication   She reports chest pain across her lower chest when she takes a deep breath.  Tolerating regular diet, denies N/V.   She has no ambulated yet.   She denies headache, chest pain, SOB, and leg pain.  + flatus/-BM/+ schwab    O:   T(C): 36.8 (12-02-21 @ 04:43), Max: 36.8 (12-02-21 @ 04:43)  HR: 86 (12-02-21 @ 04:43) (75 - 98)  BP: 110/68 (12-02-21 @ 04:43) (110/68 - 133/72)  RR: 18 (12-02-21 @ 04:43) (14 - 22)  SpO2: 95% (12-02-21 @ 04:43) (95% - 100%)    Gen: NAD, AOx3  CV: RRR  Pulm: CTAB  Chest: non-tender to palpation of chest wall  Abdomen: soft, mildly distended, tympanic, appropriately tender, + BS   Incisions: clean dry and intact with dermabond x5  : schwab in place, draining clear yellow urine  Extrem: no calf tenderness or edema, SCDs    Labs:       12-01-21 @ 07:01  -  12-02-21 @ 06:48  --------------------------------------------------------  IN: 0 mL / OUT: 3550 mL / NET: -3550 mL                        11.0   8.02  )-----------( 153      ( 02 Dec 2021 05:43 )             34.5     12-02    144  |  111<H>  |  9.4  ----------------------------<  107<H>  4.2   |  20.0<L>  |  0.63    Ca    8.5<L>      02 Dec 2021 05:44                 MONCHO SAHNI is a 57yo now POD#1 s/p robotic-assisted sacrocolpopexy, midurethral sling, and cystoscopy for vaginal prolapse and TUCKER.     S:    Patient was seen and examined at bedside. Pain is controlled with PRN medication   She reports chest pain across her lower chest when she takes a deep breath.  Tolerating regular diet, denies N/V.   She has no ambulated yet.   She denies headache, chest pain, SOB, and leg pain.  + flatus/-BM/+ schwab    O:   T(C): 36.8 (12-02-21 @ 04:43), Max: 36.8 (12-02-21 @ 04:43)  HR: 86 (12-02-21 @ 04:43) (75 - 98)  BP: 110/68 (12-02-21 @ 04:43) (110/68 - 133/72)  RR: 18 (12-02-21 @ 04:43) (14 - 22)  SpO2: 95% (12-02-21 @ 04:43) (95% - 100%)    Gen: NAD, AOx3  CV: RRR  Pulm: CTAB  Chest: non-tender to palpation of chest wall  Abdomen: soft, mildly distended, appropriately tender, + BS   Incisions: clean dry and intact with dermabond x5  : schwab in place, draining clear yellow urine  Extrem: no calf tenderness or edema, SCDs    Labs:       12-01-21 @ 07:01  -  12-02-21 @ 06:48  --------------------------------------------------------  IN: 0 mL / OUT: 3550 mL / NET: -3550 mL                        11.0   8.02  )-----------( 153      ( 02 Dec 2021 05:43 )             34.5     12-02    144  |  111<H>  |  9.4  ----------------------------<  107<H>  4.2   |  20.0<L>  |  0.63    Ca    8.5<L>      02 Dec 2021 05:44

## 2021-12-02 NOTE — PROGRESS NOTE ADULT - SUBJECTIVE AND OBJECTIVE BOX
Urogyn Attd:     Per report, EKG from earlier NSR, and patient denying pain. Chart reviewed, VSS and UOP adequate. Will observe tonight, anticipate DC home tomorrow morning after (active vs passive?) TOV if otherwise clinically well.     SOWMYA Peres MD   Urogyn Attd:     Per report from resident, EKG from earlier NSR, and patient denying pain. Chart reviewed, VSS and UOP adequate. Will observe tonight, anticipate DC home tomorrow morning after (active vs passive?) TOV if otherwise clinically well.     SOWMYA Peres MD   Urogyn Attd:     Per report from resident, EKG from earlier NSR, and patient denying pain. Chart reviewed, VSS and UOP adequate. IVF discontinued as patient is tolerating diet. Will observe tonight, anticipate DC home tomorrow morning after (active vs passive?) TOV if otherwise clinically well. Continue Lovenox and OOB DVT prophylaxis.    SOWMYA Peres MD   Urogyn Attd:     Per report from resident, EKG from earlier NSR, and patient denying pain. Chart reviewed, VSS and UOP adequate. IVF discontinued as patient is tolerating diet. Will observe tonight, anticipate DC home tomorrow morning after (active vs passive?) TOV if otherwise clinically well. Continue Lovenox and OOB DVT prophylaxis.    SOWMYA Peres MD      Urogyn Attd Addendum:  Upon viewing the EKG from earlier, nonspecific T-wave abnormalities were noted on EKG with NSR. No preop EKG to view to compare. Cardiac consult placed at 6 pm.   Dr. Peres

## 2021-12-02 NOTE — CHART NOTE - NSCHARTNOTEFT_GEN_A_CORE
Late entry note from 1AM    Gyn Progress Note  MONCHO SAHNI  55yo F status post robotic assisted SCP, AP repair, cystoscopy for TUCKER and incomplete bladder emptying. The surgery complicated by SCP sutures identified on the cystoscopy and replaced.     Subjective:     Pt seen and examined at bedside. resting comfortably in her bed. No events overnight. Pain well controlled. Patient has not been ambulating yet. Passing flatus. Tolerating clear liquid diet. Pt denies fever, chills, chest pain, SOB, nausea, vomiting, lightheadedness, dizziness.      Objective:  T(F): 98.2 (12-02-21 @ 04:43), Max: 98.2 (12-02-21 @ 04:43)  HR: 86 (12-02-21 @ 04:43) (73 - 98)  BP: 110/68 (12-02-21 @ 04:43) (110/68 - 133/72)  RR: 18 (12-02-21 @ 04:43) (14 - 22)  SpO2: 95% (12-02-21 @ 04:43) (95% - 100%)  I&O's Summary    01 Dec 2021 07:01  -  02 Dec 2021 05:27  --------------------------------------------------------  IN: 0 mL / OUT: 750 mL / NET: -750 mL    MEDICATIONS  (STANDING):  acetaminophen     Tablet .. 975 milliGRAM(s) Oral every 6 hours  enoxaparin Injectable 40 milliGRAM(s) SubCutaneous daily  influenza   Vaccine 0.5 milliLiter(s) IntraMuscular once  ketorolac   Injectable 30 milliGRAM(s) IV Push every 8 hours  lactated ringers. 1000 milliLiter(s) (100 mL/Hr) IV Continuous <Continuous>  polyethylene glycol 3350 17 Gram(s) Oral at bedtime    MEDICATIONS  (PRN):  acetaminophen     Tablet .. 975 milliGRAM(s) Oral every 6 hours PRN Mild Pain (1 - 3)  gabapentin 300 milliGRAM(s) Oral every 8 hours PRN severe pain  ondansetron    Tablet 8 milliGRAM(s) Oral every 8 hours PRN Nausea and/or Vomiting  oxyCODONE    IR 5 milliGRAM(s) Oral every 3 hours PRN Moderate Pain (4 - 6)  simethicone 80 milliGRAM(s) Chew every 6 hours PRN Gas      Physical Exam:  Constitutional: NAD, A+O x3  CV: RRR  Lungs: clear to auscultation bilaterally  Abdomen: soft, positive bowel sounds, appropriately tender, softly distended, no rebound or guarding  Laparoscopic Incisions: clean, dry, intact  Extremities: no lower extremity edema or calf tenderness bilaterally, venodynes in place        Plan:   Continue oral meds for pain control  CV: Hemodynamically stable  Pulm: Saturating well on RA. Increase incentive spirometry, out of bed, and ambulation as tolerated.  GI: advance diet as tolerated  : patient to be discharged with the schwab catheter in place and close outpatient follow up  Heme: Continue Lovenox and Venodynes for DVT ppx. Increase OOB and ambulation.

## 2021-12-02 NOTE — PROGRESS NOTE ADULT - ASSESSMENT
A/P:   57yo now POD#1 s/p robotic-assisted sacrocolpopexy, midurethral sling, and cystoscopy for vaginal prolapse and TUCKER.    Gen: VSS  Neuro: Pain well controlled on current regimen  CV: patient complaining of chest pain that is not reproducible. Will order EKG.   Pulm: incentive spirometer use encouraged, pt demonstrated use. Saturating 95% on room air.   GI: Bowel sounds/function normal, tolerating PO diet  : Stinson to remain in place for 1 week postop  Heme: hemoglobin is stable at 11 from 12.8 preop  DVT ppx: ambulation encouraged, SCDs when in bed, lovenox 40mg  Dispo: pending meeting postoperative milestones, POD#1-2 A/P:   55yo now POD#1 s/p robotic-assisted sacrocolpopexy, midurethral sling, and cystoscopy for vaginal prolapse and TUCKER.    Gen: VSS  Neuro: Pain well controlled on current regimen  CV: patient complaining of chest pain that is not reproducible. Will order EKG.   Pulm: incentive spirometer use encouraged, pt demonstrated use. Saturating 95% on room air.   GI: Bowel sounds/function normal, tolerating PO diet  : Stinson to remain in place today  Heme: hemoglobin is stable at 11 from 12.8 preop  DVT ppx: ambulation encouraged, SCDs when in bed, lovenox 40mg  Dispo: pending meeting postoperative milestones, POD#1-2

## 2021-12-02 NOTE — CONSULT NOTE ADULT - ATTENDING COMMENTS
Atypical chest pain likely pleuritic   EKG shows early repolarization as in PST EKG ( unchanged)   ACS unlikely   consider pulmonary embolism if chest pain continues especially in a postoperative state  ( D-dimer may be however elevated post surgery)   consider performing U/S LE for DVT.

## 2021-12-03 ENCOUNTER — TRANSCRIPTION ENCOUNTER (OUTPATIENT)
Age: 56
End: 2021-12-03

## 2021-12-03 VITALS
SYSTOLIC BLOOD PRESSURE: 124 MMHG | DIASTOLIC BLOOD PRESSURE: 78 MMHG | RESPIRATION RATE: 18 BRPM | OXYGEN SATURATION: 100 % | TEMPERATURE: 98 F | HEART RATE: 71 BPM

## 2021-12-03 PROCEDURE — 93005 ELECTROCARDIOGRAM TRACING: CPT

## 2021-12-03 PROCEDURE — C1771: CPT

## 2021-12-03 PROCEDURE — 84484 ASSAY OF TROPONIN QUANT: CPT

## 2021-12-03 PROCEDURE — C1781: CPT

## 2021-12-03 PROCEDURE — 93970 EXTREMITY STUDY: CPT

## 2021-12-03 PROCEDURE — 86901 BLOOD TYPING SEROLOGIC RH(D): CPT

## 2021-12-03 PROCEDURE — 80048 BASIC METABOLIC PNL TOTAL CA: CPT

## 2021-12-03 PROCEDURE — 36415 COLL VENOUS BLD VENIPUNCTURE: CPT

## 2021-12-03 PROCEDURE — S2900: CPT

## 2021-12-03 PROCEDURE — 93970 EXTREMITY STUDY: CPT | Mod: 26

## 2021-12-03 PROCEDURE — 85027 COMPLETE CBC AUTOMATED: CPT

## 2021-12-03 PROCEDURE — 86900 BLOOD TYPING SEROLOGIC ABO: CPT

## 2021-12-03 PROCEDURE — 86850 RBC ANTIBODY SCREEN: CPT

## 2021-12-03 RX ORDER — ACETAMINOPHEN 500 MG
3 TABLET ORAL
Qty: 36 | Refills: 0
Start: 2021-12-03 | End: 2021-12-05

## 2021-12-03 RX ORDER — OXYCODONE HYDROCHLORIDE 5 MG/1
1 TABLET ORAL
Qty: 8 | Refills: 0
Start: 2021-12-03 | End: 2021-12-04

## 2021-12-03 RX ORDER — IBUPROFEN 200 MG
1 TABLET ORAL
Qty: 28 | Refills: 0
Start: 2021-12-03 | End: 2021-12-09

## 2021-12-03 RX ADMIN — Medication 975 MILLIGRAM(S): at 05:17

## 2021-12-03 RX ADMIN — Medication 975 MILLIGRAM(S): at 14:58

## 2021-12-03 RX ADMIN — Medication 975 MILLIGRAM(S): at 06:17

## 2021-12-03 RX ADMIN — Medication 975 MILLIGRAM(S): at 00:26

## 2021-12-03 RX ADMIN — Medication 975 MILLIGRAM(S): at 13:24

## 2021-12-03 NOTE — DISCHARGE NOTE NURSING/CASE MANAGEMENT/SOCIAL WORK - PATIENT PORTAL LINK FT
You can access the FollowMyHealth Patient Portal offered by [facility] by registering at the following website: [email or web address]. By joining [facility]’s FollowMyHealth portal, you will also be able to view your health information using other applications (apps) compatible with our system.

## 2021-12-03 NOTE — DISCHARGE NOTE NURSING/CASE MANAGEMENT/SOCIAL WORK - NSDCPEFALRISK_GEN_ALL_CORE
For information on Fall & Injury Prevention, visit: [email or web address] OR  [email or web address] OR  https://www.cdc.gov/steadi/patient.html

## 2021-12-03 NOTE — DISCHARGE NOTE PROVIDER - CARE PROVIDER_API CALL
Cristina Peres)  Female Pelvic MedReconst Surg; Obstetrics and Gynecology  [address], [address]  [place] [address]  Phone: [phone]  Fax: [phone]  Follow Up Time:

## 2021-12-03 NOTE — PROGRESS NOTE ADULT - ATTENDING COMMENTS
Urogyn Attd:     Patient seen and examined. Agree with resident note today. Feels well. +OOB. No CP/SOB/lightheadedness/dizziness. Jessica reg diet, no N/V, +flatus and no BM yet. No subjective fever/chills. Denies flank pain or calf pain.     VSS and UOP adequate for 60 kg  No CVAT b/l  Abd soft, ND, NT, no R/G, 7 incisions C/D/I dermabond glue without skin erythema or induration; no suprapubic tenderness  No calf tenderness bilaterally  Schwab in situ draining link pink-tinged clear urine    12/2/21 POD1 labs: H/H 11/34.5 (from 12.8/40 preop), wbc 8; BUN/Cr 9.4/0.6, troponin neg x 1    POD2 s/p exam under anesthesia / robotic lysis of adhesions / sacral colpopexy / TVT midurethral sling / cystourethroscopy. Hemodynamically stable. I appreciate cardiac consult input. I feel she is stable from a cardiac point of view. Bowel regimen reviewed - miralax qhs. OOB DVT prophylaxis, Lovenox while still inpatient. PO pain meds, Rx sent via outpatient EMR. With regard to bladder function, there is no flank pain, no suprapubic pain, no abdominal distension, UOP is more than adequate in fact UOP is over 100 cc / hour, and BMP yesterday wnl. Will send home with leg-bag/schwab catheter to allow bladder rest during healing. She will come in to the outpatient [place] office Tues 12/7/21 for evaluation and likely catheter removal with trial of void. She has been given strict precautions - if uop increasing in redness, if flank pain, suprapubic pain, nausea or vomiting, reduced appetite, fever or chills then to ED and call my office right away. She understood and agreed. All questions answered.    SOWMYA Peres MD  [phone] (cell)

## 2021-12-03 NOTE — PROGRESS NOTE ADULT - ASSESSMENT
A/P: 55yo now POD#2 s/p robotic-assisted sacrocolpopexy, midurethral sling, and cystoscopy for vaginal prolapse and TUCKER.    Gen: VSS  Neuro: Pain well controlled on current regimen  CV: Janki CP today, EKG - NSR with early repolarization in anterolateral leads. Cardio rec acknowledged and appreciated.   Pulm: Incentive spirometer use encouraged. Saturating 95% on room air.   GI: Bowel sounds/function normal, tolerating PO diet  : Schwab in place, to be discharged with schwab.   Heme:Stable  DVT ppx: Ambulation encouraged, SCDs when in bed, lovenox 40mg    Dispo: Discharge home today.    Discussed with Dr. Dr. Peres.

## 2021-12-03 NOTE — DISCHARGE NOTE PROVIDER - NSDCFUSCHEDAPPT_GEN_ALL_CORE_FT
MONCHO SAHNI ; 12/17/2021 ; NPP [facility] [address] [facility]  MONCHO SAHNI ; 01/14/2022 ; NPP Urogyn  [facility]

## 2021-12-03 NOTE — DISCHARGE NOTE PROVIDER - NSDCCPCAREPLAN_GEN_ALL_CORE_FT
PRINCIPAL DISCHARGE DIAGNOSIS  Diagnosis: Urinary, incontinence, stress female  Assessment and Plan of Treatment:       SECONDARY DISCHARGE DIAGNOSES  Diagnosis: Vaginal prolapse  Assessment and Plan of Treatment:

## 2021-12-03 NOTE — PROGRESS NOTE ADULT - SUBJECTIVE AND OBJECTIVE BOX
MONCHO SAHNI is a 57yo now POD#2 s/p robotic-assisted sacrocolpopexy, midurethral sling, and cystoscopy for vaginal prolapse and TUCKER.     S:    Patient was seen and examined at bedside. Pain is controlled with PRN medication   Tolerating regular diet, denies N/V.   She is ambulating  She denies headache, chest pain, SOB, and leg pain.  + flatus/-BM/+ schwab    ICU Vital Signs Last 24 Hrs  T(C): 36.8 (03 Dec 2021 05:36), Max: 36.9 (02 Dec 2021 21:50)  T(F): 98.3 (03 Dec 2021 05:36), Max: 98.4 (02 Dec 2021 21:50)  HR: 93 (03 Dec 2021 05:36) (74 - 93)  BP: 129/77 (03 Dec 2021 05:36) (100/67 - 129/77)  RR: 18 (03 Dec 2021 05:36) (18 - 18)  SpO2: 95% (03 Dec 2021 05:36) (95% - 99%)    General: well appearing; no distress  Cardiovascular: regular rate and rhythm, no murmurs, rubs or gallops appreciated  Respiratory: lungs clear to auscultation bilaterally  Abdominal: Appropriately tender to palpation; incisions appear dry and intact with dermabond x5  : Schwab in place  Extremities: no redness, tenderness, swelling or warmth on palpation      12-01-21 @ 07:01  -  12-02-21 @ 07:00  --------------------------------------------------------  IN: 0 mL / OUT: 3550 mL / NET: -3550 mL    12-02-21 @ 07:01  -  12-03-21 @ 06:20  --------------------------------------------------------  IN: 1880 mL / OUT: 3500 mL / NET: -1620 mL                          11.0   8.02  )-----------( 153      ( 02 Dec 2021 05:43 )             34.5

## 2021-12-03 NOTE — PROGRESS NOTE ADULT - SUBJECTIVE AND OBJECTIVE BOX
B/L LE Doppler neg for DVT. Stable to DC home with leg-bag schwab. Has f/u appt Tunia 12/7/21 in morning in [address] [place] [address] office. Precautions reviewed and patient aware.

## 2021-12-03 NOTE — DISCHARGE NOTE PROVIDER - REASON FOR ADMISSION
Robotic-assisted sacrocolpopexy, midurethral sling, and cystoscopy for vaginal prolapse and stress urinary incontinence

## 2021-12-03 NOTE — DISCHARGE NOTE PROVIDER - HOSPITAL COURSE
Pt was admitted for Robotic-assisted sacrocolpopexy, midurethral sling, and cystoscopy for vaginal prolapse and stress urinary incontinence. After successful extubation, patient was transferred to the PACU, then to the floor in stable condition. Hospital stay unremarkable. Upon discharge, patient is stable, ambulating, tolerating PO, and passing flatus. Patient will be discharge with schwab in place.

## 2021-12-03 NOTE — DISCHARGE NOTE PROVIDER - NSDCMRMEDTOKEN_GEN_ALL_CORE_FT
acetaminophen 325 mg oral capsule: 3 cap(s) orally every 6 hours   ibuprofen 600 mg oral tablet: 1 tab(s) orally every 6 hours   oxyCODONE 5 mg oral capsule: 1 cap(s) orally every 6 hours MDD:4

## 2021-12-03 NOTE — DISCHARGE NOTE PROVIDER - NSDCCPTREATMENT_GEN_ALL_CORE_FT
PRINCIPAL PROCEDURE  Procedure: Sacrocolpopexy using mesh  Findings and Treatment:       SECONDARY PROCEDURE  Procedure: Creation, sling, midurethral, with cystoscopy  Findings and Treatment:

## 2021-12-07 ENCOUNTER — APPOINTMENT (OUTPATIENT)
Dept: UROGYNECOLOGY | Facility: CLINIC | Age: 56
End: 2021-12-07
Payer: COMMERCIAL

## 2021-12-07 PROCEDURE — 99024 POSTOP FOLLOW-UP VISIT: CPT

## 2021-12-07 NOTE — DISCUSSION/SUMMARY
[Post-Op instructions given. Pt/family verbalizes understanding] : post-operative instructions were provided to the patient/family who verbalize understanding [Risks/Benefits discussed. Pt/family verbalizes understanding] : risks and benefits of the procedure were discussed with the patient/family who verbalize understanding [FreeTextEntry1] : POD 6 from 12/1/21: robotic extensive ISABEL / ASC / TVT MUS / cystourethroscopy. Cath left in postop due to extensive dissection and trauma to bladder intraop (without evidence of cystotomy) to allow for healing. Today the cath was draining clear yellow urine prior to the TOV. TOV passed with PVR 0 ml. Vaginal exam was normal without suture or mesh exposure and without discharge or fluid noted in vagina. Good POP reduction noted as well. RTO for 2 week postop visit. Precautions reviewed.

## 2021-12-07 NOTE — SUBJECTIVE
[FreeTextEntry1] : feels well, no fever/chills, no flank pain, no vaginal bleeding and scant discharge [FreeTextEntry7] : none, using rare tylenol motrin with food [FreeTextEntry6] : no N/V, normal appetite [FreeTextEntry5] : josselin in [FreeTextEntry4] : normal, denies straining - consistency normal and without blood

## 2021-12-07 NOTE — OBJECTIVE
[Post Void Residual ____ ml] : Post Void Residual was [unfilled] ml [Soft and Nontender] : soft and nontender [Clean, Dry, Intact] : Clean, Dry, Intact [Good Support] : Good support [FreeTextEntry1] : 5 lsc sites dermabone c/d/i; no CVAT bilaterally; no suprapubic tenderness or palpable collection; 1 digit rectal exam normal at end of exam without suture or mesh in rectum [FreeTextEntry2] : 2 mons TVT exit sites dermabond c/d/i [FreeTextEntry3] : visualization with gentle speculum exam no mesh or suture exposure, apex well reduced, no spillage of fluid into vaginal with active voiding trial/bladder backfill; Active TOV: leg-bag schwab removed (clear yellow urine), backfilled about 200 ml until patient had medium urge to void, she voided immediately 300 ml --> PVR 0

## 2021-12-17 ENCOUNTER — APPOINTMENT (OUTPATIENT)
Dept: UROGYNECOLOGY | Facility: CLINIC | Age: 56
End: 2021-12-17
Payer: SELF-PAY

## 2021-12-17 DIAGNOSIS — Z87.19 PERSONAL HISTORY OF OTHER DISEASES OF THE DIGESTIVE SYSTEM: ICD-10-CM

## 2021-12-17 DIAGNOSIS — N81.10 CYSTOCELE, UNSPECIFIED: ICD-10-CM

## 2021-12-17 DIAGNOSIS — Z87.898 PERSONAL HISTORY OF OTHER SPECIFIED CONDITIONS: ICD-10-CM

## 2021-12-17 DIAGNOSIS — R10.2 PELVIC AND PERINEAL PAIN: ICD-10-CM

## 2021-12-17 DIAGNOSIS — N39.3 STRESS INCONTINENCE (FEMALE) (MALE): ICD-10-CM

## 2021-12-17 DIAGNOSIS — R33.9 RETENTION OF URINE, UNSPECIFIED: ICD-10-CM

## 2021-12-17 PROCEDURE — 99024 POSTOP FOLLOW-UP VISIT: CPT

## 2021-12-17 PROCEDURE — 51798 US URINE CAPACITY MEASURE: CPT

## 2021-12-17 NOTE — SUBJECTIVE
[FreeTextEntry1] : feels well, no fever/chills, no vag bleeding or discharge [FreeTextEntry7] : intermittent gas pain only [FreeTextEntry6] : normal no n/v, gas pain at times has to pass bowel movement [FreeTextEntry5] : denies leakage, no dysuria or hematuria, no incomplete emptying, denies UI [FreeTextEntry4] : normal consistency and no blood, about 3 BMs daily which was normal for her prior to surg [FreeTextEntry3] : normal [FreeTextEntry2] : normal

## 2021-12-17 NOTE — DISCUSSION/SUMMARY
[Post-Op instructions given. Pt/family verbalizes understanding] : post-operative instructions were provided to the patient/family who verbalize understanding [Risks/Benefits discussed. Pt/family verbalizes understanding] : risks and benefits of the procedure were discussed with the patient/family who verbalize understanding [FreeTextEntry1] : Popstop from 12/1/21: robotic extensive ISABEL / ASC / TVT MUS / cystourethroscopy. PVR normal, no mesh exposure. Continue bowel regimen and f/u symptoms. May do well with kegels after 6 week postop visit. RT work letter given for 1/12/22 for after next postop visit currently scheduled for 1/11/22.

## 2021-12-17 NOTE — OBJECTIVE
[Post Void Residual ____ ml] : Post Void Residual was [unfilled] ml [Soft and Nontender] : soft and nontender [Clean, Dry, Intact] : Clean, Dry, Intact [FreeTextEntry2] : 5 x LSC scars and TVT exit sites [FreeTextEntry3] : no mesh exposure, A -1 with anterior suture line intact +vicryl sutures and no mesh exposure, apex reduced, TVL about 7 and C -7, P -2

## 2022-01-11 ENCOUNTER — APPOINTMENT (OUTPATIENT)
Dept: UROGYNECOLOGY | Facility: CLINIC | Age: 57
End: 2022-01-11
Payer: COMMERCIAL

## 2022-01-11 DIAGNOSIS — N95.2 POSTMENOPAUSAL ATROPHIC VAGINITIS: ICD-10-CM

## 2022-01-11 DIAGNOSIS — N81.11 CYSTOCELE, MIDLINE: ICD-10-CM

## 2022-01-11 DIAGNOSIS — Z98.890 OTHER SPECIFIED POSTPROCEDURAL STATES: ICD-10-CM

## 2022-01-11 DIAGNOSIS — N90.5 ATROPHY OF VULVA: ICD-10-CM

## 2022-01-11 PROCEDURE — 99024 POSTOP FOLLOW-UP VISIT: CPT

## 2022-01-11 PROCEDURE — 51798 US URINE CAPACITY MEASURE: CPT

## 2022-01-14 NOTE — SUBJECTIVE
[FreeTextEntry1] : no excessive vag discharge, no vag leakage, no vag bleeding, no pain, no fever/chills [FreeTextEntry7] : minimal discomfort with passage of BMs resolved [FreeTextEntry6] : normal no N/V [FreeTextEntry5] : good, no leakage, no dysuria or UTIs, no hematuria, no incomplete emptying [FreeTextEntry4] : normal consistency with use of miralax twice weekly, no blood, no watery stool, occasionally some fecal urgency without passage of bowel movements, no FI

## 2022-01-14 NOTE — DISCUSSION/SUMMARY
[Post-Op instructions given. Pt/family verbalizes understanding] : post-operative instructions were provided to the patient/family who verbalize understanding [Risks/Benefits discussed. Pt/family verbalizes understanding] : risks and benefits of the procedure were discussed with the patient/family who verbalize understanding [FreeTextEntry1] : Postop from 12/1/21: robotic extensive ISABEL / ASC / TVT MUS / cystourethroscopy. She feels well, no subj TUCKER or POP symptoms. Exam normal. Precautions reviewed. Continue bowel regimen with miralax twice weekly for now. Discussed mild asymptomatic cystocele - kegel instruction given for long term control. Can return to work - letter given prior. Avoid heavy lifting another two weeks. RTO 1 year postop or prn. All ques answered.\par

## 2022-01-14 NOTE — OBJECTIVE
[Soft and Nontender] : soft and nontender [Clean, Dry, Intact] : Clean, Dry, Intact [Good Support] : Good support [Healing well] : healing well [No Masses or Tenderness] : no masses or tenderness [FreeTextEntry2] : 5 x robotic [FreeTextEntry3] : cuff intact, no mesh or suture exposure, A -1 / C -7 / TVL 7 / P -2.

## 2023-11-07 NOTE — ASU PREOP CHECKLIST - BSA (M2)
1.6 